# Patient Record
Sex: MALE | Race: WHITE | Employment: FULL TIME | URBAN - METROPOLITAN AREA
[De-identification: names, ages, dates, MRNs, and addresses within clinical notes are randomized per-mention and may not be internally consistent; named-entity substitution may affect disease eponyms.]

---

## 2017-09-27 ENCOUNTER — THERAPY VISIT (OUTPATIENT)
Dept: PHYSICAL THERAPY | Facility: CLINIC | Age: 52
End: 2017-09-27
Payer: OTHER MISCELLANEOUS

## 2017-09-27 DIAGNOSIS — Z98.890 H/O REPAIR OF LEFT ROTATOR CUFF: ICD-10-CM

## 2017-09-27 DIAGNOSIS — M25.512 ACUTE PAIN OF LEFT SHOULDER: Primary | ICD-10-CM

## 2017-09-27 PROCEDURE — 97161 PT EVAL LOW COMPLEX 20 MIN: CPT | Mod: GP | Performed by: PHYSICAL THERAPIST

## 2017-09-27 PROCEDURE — 97110 THERAPEUTIC EXERCISES: CPT | Mod: GP | Performed by: PHYSICAL THERAPIST

## 2017-09-27 NOTE — LETTER
[facility]  [address]  [place] [address]  [phone]    2017    Re: Brad Peter   :   1965  MRN:  [id number]   REFERRING PHYSICIAN:   [facility]    Date of Initial Evaluation:  17  Visits:  Rxs Used: 1  Reason for Referral:     Acute pain of left shoulder  H/O repair of left rotator cuff    [facility] Initial Evaluation    Subjective:  Patient is a 52 year old male presenting with rehab left shoulder hpi. The history is provided by the patient. No  was used.   Brad Peter is a 52 year old male with a left shoulder condition.  Condition occurred with:  Repetition/overuse.  Condition occurred: at work.  This is a chronic condition  DOS: 17, MD order for physical therapy to address s/p L RCR.    Patient reports pain:  Anterior and lateral.    Pain is described as aching  and reported as 3/10.  Associated symptoms:  Loss of motion/stiffness.   Symptoms are exacerbated by certain positions and relieved by rest and ice.          General health as reported by patient is good.  Pertinent medical history includes:  Sleep disorder/apnea, depression and overweight.      Current medications:  Hormone replacement therapy and anti-depressants.  Current occupation is [employer] .  Patient is currently not working due to present treatment problem.  Primary job tasks include:  Prolonged standing, lifting, repetitive tasks, operating a machine and other (pushing/pulling).  Barriers include:  None as reported by the patient.  Red flags:  None as reported by the patient.    Objective:    Shoulder Evaluation:  ROM:    PROM:    Flexion:  Left:  120-130        Internal Rotation:  Left:  20-40      External Rotation:  Left:  70        Assessment/Plan:    Patient is a 52 year old male with left side shoulder complaints.    Patient has the following significant findings with corresponding  treatment plan.                Diagnosis 1:  S/p L RCR    Pain -  hot/cold therapy, self management, education and home program  Decreased ROM/flexibility - manual therapy, therapeutic exercise and home program  Decreased strength - therapeutic exercise, therapeutic activities and home program  Decreased proprioception - neuro re-education, therapeutic activities and home program  Decreased function - therapeutic activities and home program    Therapy Evaluation Codes:   1) History comprised of:   Personal factors that impact the plan of care:      Anxiety.    Comorbidity factors that impact the plan of care are:      Overweight and Sleep disorder/apnea.     Medications impacting care: Anti-depressant.  2) Examination of Body Systems comprised of:   Body structures and functions that impact the plan of care:      Shoulder.   Activity limitations that impact the plan of care are:      Bathing, Dressing, Lifting, Throwing and Working.  3) Clinical presentation characteristics are:   Stable/Uncomplicated.  4) Decision-Making    Low complexity using standardized patient assessment instrument and/or measureable assessment of functional outcome.    Cumulative Therapy Evaluation is: Low complexity.  Previous and current functional limitations:  (See Goal Flow Sheet for this information)    Short term and Long term goals: (See Goal Flow Sheet for this information)   Communication ability:  Patient appears to be able to clearly communicate and understand verbal and written communication and follow directions correctly.  Treatment Explanation - The following has been discussed with the patient:   RX ordered/plan of care  Anticipated outcomes  Possible risks and side effects  This patient would benefit from PT intervention to resume normal activities.   Rehab potential is good.    Frequency:  1 X week, once daily  Duration:  for 10 weeks  Discharge Plan:  Achieve all LTG.  Independent in home treatment program.  Reach maximal  therapeutic benefit.    Thank you for your referral.    INQUIRIES  Therapist: [facility]  [address]  [place] [address]  Phone: [phone]  Fax: [phone]

## 2017-09-27 NOTE — PROGRESS NOTES
[facility] Initial Evaluation    Subjective:    Patient is a 52 year old male presenting with rehab left shoulder hpi. The history is provided by the patient. No  was used.   Brad Peter is a 52 year old male with a left shoulder condition.  Condition occurred with:  Repetition/overuse.  Condition occurred: at work.  This is a chronic condition  DOS: 9/6/17, MD order for physical therapy to address s/p L RCR.    Patient reports pain:  Anterior and lateral.    Pain is described as aching  and reported as 3/10.  Associated symptoms:  Loss of motion/stiffness.   Symptoms are exacerbated by certain positions and relieved by rest and ice.          General health as reported by patient is good.  Pertinent medical history includes:  Sleep disorder/apnea, depression and overweight.      Current medications:  Hormone replacement therapy and anti-depressants.  Current occupation is [employer] .  Patient is currently not working due to present treatment problem.  Primary job tasks include:  Prolonged standing, lifting, repetitive tasks, operating a machine and other (pushing/pulling).    Barriers include:  None as reported by the patient.    Red flags:  None as reported by the patient.                        Objective:    System                   Shoulder Evaluation:  ROM:    PROM:    Flexion:  Left:  120-130              Internal Rotation:  Left:  20-40      External Rotation:  Left:  70                                                                   General     ROS    Assessment/Plan:      Patient is a 52 year old male with left side shoulder complaints.    Patient has the following significant findings with corresponding treatment plan.                Diagnosis 1:  S/p L RCR    Pain -  hot/cold therapy, self management, education and home program  Decreased ROM/flexibility - manual therapy, therapeutic exercise and home program  Decreased strength - therapeutic exercise,  therapeutic activities and home program  Decreased proprioception - neuro re-education, therapeutic activities and home program  Decreased function - therapeutic activities and home program    Therapy Evaluation Codes:   1) History comprised of:   Personal factors that impact the plan of care:      Anxiety.    Comorbidity factors that impact the plan of care are:      Overweight and Sleep disorder/apnea.     Medications impacting care: Anti-depressant.  2) Examination of Body Systems comprised of:   Body structures and functions that impact the plan of care:      Shoulder.   Activity limitations that impact the plan of care are:      Bathing, Dressing, Lifting, Throwing and Working.  3) Clinical presentation characteristics are:   Stable/Uncomplicated.  4) Decision-Making    Low complexity using standardized patient assessment instrument and/or measureable assessment of functional outcome.  Cumulative Therapy Evaluation is: Low complexity.    Previous and current functional limitations:  (See Goal Flow Sheet for this information)    Short term and Long term goals: (See Goal Flow Sheet for this information)     Communication ability:  Patient appears to be able to clearly communicate and understand verbal and written communication and follow directions correctly.  Treatment Explanation - The following has been discussed with the patient:   RX ordered/plan of care  Anticipated outcomes  Possible risks and side effects  This patient would benefit from PT intervention to resume normal activities.   Rehab potential is good.    Frequency:  1 X week, once daily  Duration:  for 10 weeks  Discharge Plan:  Achieve all LTG.  Independent in home treatment program.  Reach maximal therapeutic benefit.    Please refer to the daily flowsheet for treatment today, total treatment time and time spent performing 1:1 timed codes.

## 2017-10-04 ENCOUNTER — THERAPY VISIT (OUTPATIENT)
Dept: PHYSICAL THERAPY | Facility: CLINIC | Age: 52
End: 2017-10-04
Payer: OTHER MISCELLANEOUS

## 2017-10-04 DIAGNOSIS — Z98.890 H/O REPAIR OF LEFT ROTATOR CUFF: ICD-10-CM

## 2017-10-04 DIAGNOSIS — M25.512 ACUTE PAIN OF LEFT SHOULDER: ICD-10-CM

## 2017-10-04 PROCEDURE — 97112 NEUROMUSCULAR REEDUCATION: CPT | Mod: GP | Performed by: PHYSICAL THERAPIST

## 2017-10-04 PROCEDURE — 97110 THERAPEUTIC EXERCISES: CPT | Mod: GP | Performed by: PHYSICAL THERAPIST

## 2017-10-11 ENCOUNTER — THERAPY VISIT (OUTPATIENT)
Dept: PHYSICAL THERAPY | Facility: CLINIC | Age: 52
End: 2017-10-11
Payer: OTHER MISCELLANEOUS

## 2017-10-11 DIAGNOSIS — M25.512 ACUTE PAIN OF LEFT SHOULDER: ICD-10-CM

## 2017-10-11 DIAGNOSIS — Z98.890 H/O REPAIR OF LEFT ROTATOR CUFF: ICD-10-CM

## 2017-10-11 PROCEDURE — 97110 THERAPEUTIC EXERCISES: CPT | Mod: GP | Performed by: PHYSICAL THERAPIST

## 2017-10-11 PROCEDURE — 97112 NEUROMUSCULAR REEDUCATION: CPT | Mod: GP | Performed by: PHYSICAL THERAPIST

## 2017-10-27 ENCOUNTER — THERAPY VISIT (OUTPATIENT)
Dept: PHYSICAL THERAPY | Facility: CLINIC | Age: 52
End: 2017-10-27
Payer: OTHER MISCELLANEOUS

## 2017-10-27 DIAGNOSIS — Z98.890 H/O REPAIR OF LEFT ROTATOR CUFF: ICD-10-CM

## 2017-10-27 DIAGNOSIS — M25.512 ACUTE PAIN OF LEFT SHOULDER: ICD-10-CM

## 2017-10-27 PROCEDURE — 97112 NEUROMUSCULAR REEDUCATION: CPT | Mod: GP | Performed by: PHYSICAL THERAPIST

## 2017-10-27 PROCEDURE — 97110 THERAPEUTIC EXERCISES: CPT | Mod: GP | Performed by: PHYSICAL THERAPIST

## 2017-10-27 NOTE — PROGRESS NOTES
Subjective:    HPI                    Objective:    System    Physical Exam    General     ROS    Assessment/Plan:      PROGRESS  REPORT        SUBJECTIVE  Subjective: 6 weeks post op. Intermittant soreness due to challenges at work .   Current Pain level: 1/10   Initial Pain level: 3/10   Changes in function: Yes, see goal flow sheet for change in function   Adverse reactions: None;   ,         OBJECTIVE  Objective: ER 50, IR 70, flexion 140, Abduction 90   Phase II HEP     ASSESSMENT/PLAN  Updated problem list and treatment plan: Diagnosis 1:  L Shoulder RCR     STG/LTGs have been met or progress has been made towards goals:  Yes (See Goal flow sheet completed today.)  Assessment of Progress: The patient's condition is improving.  Self Management Plans:  Patient has been instructed in a home treatment program.  I have re-evaluated this patient and find that the nature, scope, duration and intensity of the therapy is appropriate for the medical condition of the patient.        Recommendations:  This patient would benefit from further evaluation.    Please refer to the daily flowsheet for treatment today, total treatment time and time spent performing 1:1 timed codes.

## 2017-10-27 NOTE — LETTER
[facility]  [address]  [place] [id number] [phone]    2017    Re: Brad Peter   :   1965  MRN:  [id number]   REFERRING PHYSICIAN:   [facility]    Date of Initial Evaluation:  17  Visits:  Rxs Used: 4  Reason for Referral:     Acute pain of left shoulder  H/O repair of left rotator cuff    PROGRESS  REPORT    SUBJECTIVE  Subjective: 6 weeks post op. Intermittant soreness due to challenges at work .   Current Pain level: 1/10   Initial Pain level: 3/10   Changes in function: Yes, see goal flow sheet for change in function   Adverse reactions: None    OBJECTIVE  Objective: ER 50, IR 70, flexion 140, Abduction 90   Phase II HEP     ASSESSMENT/PLAN  Updated problem list and treatment plan: Diagnosis 1:  L Shoulder RCR     STG/LTGs have been met or progress has been made towards goals:  Yes (See Goal flow sheet completed today.)  Assessment of Progress: The patient's condition is improving.  Self Management Plans:  Patient has been instructed in a home treatment program.  I have re-evaluated this patient and find that the nature, scope, duration and intensity of the therapy is appropriate for the medical condition of the patient.    Recommendations:  This patient would benefit from further evaluation.  Thank you for your referral.    INQUIRIES  Therapist: [facility]  [address]  [place] [address]  Phone: [phone]  Fax: [phone]

## 2017-11-01 ENCOUNTER — THERAPY VISIT (OUTPATIENT)
Dept: PHYSICAL THERAPY | Facility: CLINIC | Age: 52
End: 2017-11-01
Payer: OTHER MISCELLANEOUS

## 2017-11-01 DIAGNOSIS — M25.512 ACUTE PAIN OF LEFT SHOULDER: ICD-10-CM

## 2017-11-01 DIAGNOSIS — Z98.890 H/O REPAIR OF LEFT ROTATOR CUFF: ICD-10-CM

## 2017-11-01 PROCEDURE — 97112 NEUROMUSCULAR REEDUCATION: CPT | Mod: GP | Performed by: PHYSICAL THERAPIST

## 2017-11-01 PROCEDURE — 97110 THERAPEUTIC EXERCISES: CPT | Mod: GP | Performed by: PHYSICAL THERAPIST

## 2017-11-20 ENCOUNTER — THERAPY VISIT (OUTPATIENT)
Dept: PHYSICAL THERAPY | Facility: CLINIC | Age: 52
End: 2017-11-20
Payer: OTHER MISCELLANEOUS

## 2017-11-20 DIAGNOSIS — M25.512 ACUTE PAIN OF LEFT SHOULDER: ICD-10-CM

## 2017-11-20 DIAGNOSIS — Z98.890 H/O REPAIR OF LEFT ROTATOR CUFF: ICD-10-CM

## 2017-11-20 PROCEDURE — 97110 THERAPEUTIC EXERCISES: CPT | Mod: GP | Performed by: PHYSICAL THERAPIST

## 2017-11-20 PROCEDURE — 97112 NEUROMUSCULAR REEDUCATION: CPT | Mod: GP | Performed by: PHYSICAL THERAPIST

## 2017-11-27 ENCOUNTER — THERAPY VISIT (OUTPATIENT)
Dept: PHYSICAL THERAPY | Facility: CLINIC | Age: 52
End: 2017-11-27
Payer: OTHER MISCELLANEOUS

## 2017-11-27 DIAGNOSIS — M25.512 ACUTE PAIN OF LEFT SHOULDER: ICD-10-CM

## 2017-11-27 DIAGNOSIS — Z98.890 H/O REPAIR OF LEFT ROTATOR CUFF: ICD-10-CM

## 2017-11-27 PROCEDURE — 97110 THERAPEUTIC EXERCISES: CPT | Mod: GP | Performed by: PHYSICAL THERAPIST

## 2017-11-27 PROCEDURE — 97112 NEUROMUSCULAR REEDUCATION: CPT | Mod: GP | Performed by: PHYSICAL THERAPIST

## 2017-12-06 ENCOUNTER — THERAPY VISIT (OUTPATIENT)
Dept: PHYSICAL THERAPY | Facility: CLINIC | Age: 52
End: 2017-12-06
Payer: OTHER MISCELLANEOUS

## 2017-12-06 DIAGNOSIS — Z98.890 H/O REPAIR OF LEFT ROTATOR CUFF: ICD-10-CM

## 2017-12-06 DIAGNOSIS — M25.512 ACUTE PAIN OF LEFT SHOULDER: ICD-10-CM

## 2017-12-06 PROCEDURE — 97112 NEUROMUSCULAR REEDUCATION: CPT | Mod: GP | Performed by: PHYSICAL THERAPIST

## 2017-12-06 PROCEDURE — 97110 THERAPEUTIC EXERCISES: CPT | Mod: GP | Performed by: PHYSICAL THERAPIST

## 2017-12-06 NOTE — PROGRESS NOTES
Subjective:    HPI                    Objective:    System    Physical Exam    General     ROS    Assessment/Plan:      PROGRESS  REPORT    Progress reporting period is from 10/27/17 to 12/6/17. 8 visits since initiating therapy .     SUBJECTIVE  Subjective: 12 weeks posyt op Subscap /bicep tenodesis arthroscopically..  Pain is low most of the time. Working with limitations still at his work place.     SPADI improved 23/100       Current Pain level: 0/10   Initial Pain level: 3/10   Changes in function: Yes, see goal flow sheet for change in function   Adverse reactions: None;   ,         OBJECTIVE  Objective: Flexion 160 vs 170 opposite side overhead reach. ER 70 vs 90  , IR 85 vs 90    posterior reach L5 L  and T12 R.     Able to advance to Phase III of post op protocol with emphais to ROM and scap strength.high reps, no weight.           ASSESSMENT/PLAN  Updated problem list and treatment plan: Diagnosis 1:  L post op L RCR     STG/LTGs have been met or progress has been made towards goals:  Yes (See Goal flow sheet completed today.)  Assessment of Progress: The patient's condition is improving.  The patient's condition has potential to improve.  Self Management Plans:  Patient has been instructed in a home treatment program.      Recommendations:  This patient would benefit from continued therapy.     Frequency:  1 X week, to every other week once daily  Duration:  for 12 weeks          Please refer to the daily flowsheet for treatment today, total treatment time and time spent performing 1:1 timed codes.

## 2017-12-06 NOTE — LETTER
[facility]   [address] [place] [id number] [phone]    2017    Re: Brad Peter   :   1965  MRN:  [id number]   REFERRING PHYSICIAN:   [facility]    Date of Initial Evaluation:  10/27/17  Visits:  Rxs Used: 8  Reason for Referral:     Acute pain of left shoulder  H/O repair of left rotator cuff    PROGRESS  REPORT  Progress reporting period is from 10/27/17 to 17. 8 visits since initiating therapy .     SUBJECTIVE  Subjective: 12 weeks posyt op Subscap /bicep tenodesis arthroscopically..  Pain is low most of the time. Working with limitations still at his work place.   SPADI improved 23/100   Current Pain level: 0/10   Initial Pain level: 3/10   Changes in function: Yes, see goal flow sheet for change in function   Adverse reactions: None       OBJECTIVE  Objective: Flexion 160 vs 170 opposite side overhead reach. ER 70 vs 90  , IR 85 vs 90    posterior reach L5 L  and T12 R.   Able to advance to Phase III of post op protocol with emphais to ROM and scap strength.high reps, no weight.       ASSESSMENT/PLAN  Updated problem list and treatment plan: Diagnosis 1:  L post op L RCR     STG/LTGs have been met or progress has been made towards goals:  Yes (See Goal flow sheet completed today.)  Assessment of Progress: The patient's condition is improving.  The patient's condition has potential to improve.  Self Management Plans:  Patient has been instructed in a home treatment program.    Recommendations:  This patient would benefit from continued therapy.     Frequency:  1 X week, to every other week once daily  Duration:  for 12 weeks    Thank you for your referral.    INQUIRIES  Therapist: [facility]   [address] [place] [address]  Phone: [phone]  Fax: [phone]

## 2017-12-11 ENCOUNTER — THERAPY VISIT (OUTPATIENT)
Dept: PHYSICAL THERAPY | Facility: CLINIC | Age: 52
End: 2017-12-11
Payer: OTHER MISCELLANEOUS

## 2017-12-11 DIAGNOSIS — M25.512 ACUTE PAIN OF LEFT SHOULDER: ICD-10-CM

## 2017-12-11 DIAGNOSIS — Z98.890 H/O REPAIR OF LEFT ROTATOR CUFF: ICD-10-CM

## 2017-12-11 PROCEDURE — 97530 THERAPEUTIC ACTIVITIES: CPT | Mod: GP | Performed by: PHYSICAL THERAPIST

## 2017-12-11 PROCEDURE — 97110 THERAPEUTIC EXERCISES: CPT | Mod: GP | Performed by: PHYSICAL THERAPIST

## 2017-12-11 PROCEDURE — 97112 NEUROMUSCULAR REEDUCATION: CPT | Mod: GP | Performed by: PHYSICAL THERAPIST

## 2017-12-27 ENCOUNTER — THERAPY VISIT (OUTPATIENT)
Dept: PHYSICAL THERAPY | Facility: CLINIC | Age: 52
End: 2017-12-27
Payer: OTHER MISCELLANEOUS

## 2017-12-27 DIAGNOSIS — Z98.890 H/O REPAIR OF LEFT ROTATOR CUFF: ICD-10-CM

## 2017-12-27 DIAGNOSIS — M25.512 ACUTE PAIN OF LEFT SHOULDER: ICD-10-CM

## 2017-12-27 PROCEDURE — 97110 THERAPEUTIC EXERCISES: CPT | Mod: GP | Performed by: PHYSICAL THERAPIST

## 2017-12-27 PROCEDURE — 97112 NEUROMUSCULAR REEDUCATION: CPT | Mod: GP | Performed by: PHYSICAL THERAPIST

## 2017-12-27 NOTE — LETTER
[facility]  [address]  [place] [address]  [phone]    2018    Re: Brad Peter   :   1965  MRN:  [id number]   REFERRING PHYSICIAN:   [facility]    Date of Initial Evaluation:  17  Visits:  Rxs Used: 10  Reason for Referral:     Acute pain of left shoulder  H/O repair of left rotator cuff    PROGRESS  REPORT    Progress reporting period is from 17 to 17.     SUBJECTIVE  Patient complains of being intermit sore, he feels due to stress and over use of  L arm at work at times.    He is approaching 4 months post op RCR (subscap), extensive debridement and bicep tenodesis.    On balance he is really responding favorably. Still restricted with activity and post op rehab based on MD referral and program post op.     Brad has done a nice job restoring ROM to roughly 90% of the opposite side. He is advised to 3x/week strengthening and gentle ROM work while he sustains work restrictions.     SPADI has improved from 45/100 to 20/100 at this point. Patient follows with his MD for post op consullt in early to mid 2018.     Patient has been seen for 10 visits post op in clinic since 2017.      Current Pain level: 0/10   Initial Pain level: 3/10   Changes in function: Yes, see goal flow sheet for change in function   Adverse reactions: None    OBJECTIVE  AROM flex 160, ER 85, IR 90. Weak and painful especially with IR resistance .     Modified HEP without Bear Hug exercise, which may be contributing to soreness.     Continue scap emphasis to strengthening as directed through [facility] post op RCR Program.     No weights at this point.    Continue to work with restrictions .     Functionally:  Floor to waist lift 1 RM 25, waist to shoulder 1 RM 25, outward reach 15# max. Limit lifting to 10-15 pounds under shoulder height, no overhead lift and limit outward lifting 5-8 #.     Follow up every 1-2  weeks in clinic  "to manage and update shoulder strength program.    Consider work conditioning at 4-5 months post op, unless directed by MD otherwise based on labor requirments of patients job demands as a \"Fluid \"       ASSESSMENT/PLAN  Updated problem list and treatment plan: Diagnosis 1:  Post op L shoulder Arthroscopic RCR , extensive debridement, open bicep tenodesis     STG/LTGs have been met or progress has been made towards goals:  Yes (See Goal flow sheet completed today.)  Assessment of Progress: The patient's condition is improving.  The patient's condition has potential to improve.  Self Management Plans:  Patient has been instructed in a home treatment program.    Recommendations:  This patient would benefit from continued therapy.     Frequency:  1 X week, to every other week once daily  Duration:  for 4 weeks    Follow up with MD for consult in Mid January 2018    Thank you for your referral.    INQUIRIES  Therapist: [facility]  [address] [place] [address]  Phone: [phone]  Fax: [phone]     "

## 2018-01-01 ENCOUNTER — TELEPHONE (OUTPATIENT)
Dept: PHYSICAL THERAPY | Facility: CLINIC | Age: 53
End: 2018-01-01

## 2018-01-02 NOTE — PROGRESS NOTES
"Subjective:  HPI                    Objective:  System    Physical Exam    General     ROS    Assessment/Plan:    PROGRESS  REPORT    Progress reporting period is from 12/6/17 to 12/27/17.     SUBJECTIVE  : Patient complains of being intermit sore, he feels due to stress and over use of  L arm at work at times.     He is approaching 4 months post op RCR (subscap), extensive debridement and bicep tenodesis.     On balance he is really responding favorably. Still restricted with activity and post op rehab based on MD referral and program post op.     Brad has done a nice job restoring ROM to roughly 90% of the opposite side. He is advised to 3x/week strengthening and gentle ROM work while he sustains work restrictions.     SPADI has improved from 45/100 to 20/100 at this point. Patient follows with his MD for post op consullt in early to mid January 2018.     Patient has been seen for 10 visits post op in clinic since September 2017.      Current Pain level: 0/10   Initial Pain level: 3/10   Changes in function: Yes, see goal flow sheet for change in function   Adverse reactions: None;   ,         OBJECTIVE  : AROM flex 160, ER 85, IR 90. Weak and painful especially with IR resistance .     Modified HEP without Bear Hug exercise, which may be contributing to soreness.     Continue scap emphasis to strengthening as directed through [facility] post op RCR Program.     No weights at this point.     Continue to work with restrictions .     Functionally:  Floor to waist lift 1 RM 25, waist to shoulder 1 RM 25, outward reach 15# max. Limit lifting to 10-15 pounds under shoulder height, no overhead lift and limit outward lifting 5-8 #.      Follow up every 1-2  weeks in clinic to manage and update shoulder strength program.     Consider work conditioning at 4-5 months post op, unless directed by MD otherwise based on labor requirments of patients job demands as a \"Fluid \"       ASSESSMENT/PLAN  Updated problem list " and treatment plan: Diagnosis 1:  Post op L shoulder Arthroscopic RCR , extensive debridement, open bicep tenodesis     STG/LTGs have been met or progress has been made towards goals:  Yes (See Goal flow sheet completed today.)  Assessment of Progress: The patient's condition is improving.  The patient's condition has potential to improve.  Self Management Plans:  Patient has been instructed in a home treatment program.        Recommendations:  This patient would benefit from continued therapy.     Frequency:  1 X week, to every other week once daily  Duration:  for 4 weeks    Follow up with MD for consult in Mid January 2018          Please refer to the daily flowsheet for treatment today, total treatment time and time spent performing 1:1 timed codes.

## 2018-01-02 NOTE — TELEPHONE ENCOUNTER
PT Auth Request 3 visits to [facility],  Edith Argueta    FAx sent with PT Progress Note.       3 future appointments have been authorized 1/3/18

## 2018-01-08 ENCOUNTER — THERAPY VISIT (OUTPATIENT)
Dept: PHYSICAL THERAPY | Facility: CLINIC | Age: 53
End: 2018-01-08
Payer: OTHER MISCELLANEOUS

## 2018-01-08 DIAGNOSIS — Z98.890 H/O REPAIR OF LEFT ROTATOR CUFF: ICD-10-CM

## 2018-01-08 DIAGNOSIS — M25.512 ACUTE PAIN OF LEFT SHOULDER: ICD-10-CM

## 2018-01-08 PROCEDURE — 97112 NEUROMUSCULAR REEDUCATION: CPT | Mod: GP | Performed by: PHYSICAL THERAPIST

## 2018-01-08 PROCEDURE — 97110 THERAPEUTIC EXERCISES: CPT | Mod: GP | Performed by: PHYSICAL THERAPIST

## 2018-01-08 NOTE — PROGRESS NOTES
Subjective:  HPI                    Objective:  System    Physical Exam    General     ROS    Assessment/Plan:    PROGRESS  REPORT    Progress reporting period is from 12/27/17 to 1/8/17.     SUBJECTIVE  Subjective: Primary concern of R shoulder pain, post surgical  1 year.  L shoulder 4 months post op. Feeling well on the L  as a revision surgry. Follow up with L shoulder consult and MD visit      SPADI 14/100.     Patient avoids overhead use and no lifting greater than 5-10 pounds below waste height.       Current Pain level: 0/10   Initial Pain level: 3/10   Changes in function: Yes, see goal flow sheet for change in function   Adverse reactions: None;   ,         OBJECTIVE   AROM 160-170 flexion, IR 80-90, ER 80. POst op program at 4 months, emphasis to gradual restoration of ROM and  scap strength as directed in post op programming.       ASSESSMENT/PLAN  Updated problem list and treatment plan: Diagnosis 1:  L shoulder post op RCR   Decreased strength - therapeutic exercise and therapeutic activities  STG/LTGs have been met or progress has been made towards goals:  Yes (See Goal flow sheet completed today.)  Assessment of Progress: The patient's condition is improving.  The patient's condition has potential to improve.  Self Management Plans:  Patient has been instructed in a home treatment program.    The patient is returning to your office for a recheck appointment.    Recommendations:  This patient would benefit from further evaluation.    Consider PT every other week for the next 8 weeks to complete RTC programming at 6 mo unless directed by MD otherwise.     Please refer to the daily flowsheet for treatment today, total treatment time and time spent performing 1:1 timed codes.

## 2018-01-22 ENCOUNTER — THERAPY VISIT (OUTPATIENT)
Dept: PHYSICAL THERAPY | Facility: CLINIC | Age: 53
End: 2018-01-22
Payer: OTHER MISCELLANEOUS

## 2018-01-22 DIAGNOSIS — M25.512 ACUTE PAIN OF LEFT SHOULDER: ICD-10-CM

## 2018-01-22 DIAGNOSIS — Z98.890 H/O REPAIR OF LEFT ROTATOR CUFF: ICD-10-CM

## 2018-01-22 PROCEDURE — 97110 THERAPEUTIC EXERCISES: CPT | Mod: GP | Performed by: PHYSICAL THERAPIST

## 2018-01-22 PROCEDURE — 97530 THERAPEUTIC ACTIVITIES: CPT | Mod: GP | Performed by: PHYSICAL THERAPIST

## 2018-01-22 PROCEDURE — 97112 NEUROMUSCULAR REEDUCATION: CPT | Mod: GP | Performed by: PHYSICAL THERAPIST

## 2018-01-22 NOTE — LETTER
[facility]   [address] [place] [id number] [phone]    2018    Re: Brad Peter   :   1965  MRN:  [id number]   REFERRING PHYSICIAN:   [facility]    Date of Initial Evaluation:  18  Visits:  Rxs Used: 12  Reason for Referral:     Acute pain of left shoulder  H/O repair of left rotator cuff    PROGRESS  REPORT  Progress reporting period is from 18 to 18. 4 1/2 -5 month post op. .     SUBJECTIVE  Subjective: 4 1/2- 5   months post op L RCR.  Minimal to no pain , transitioning to work conditioning   Current Pain level: 0/10   Initial Pain level: 3/10   Changes in function: Yes, see goal flow sheet for change in function   Adverse reactions: None     OBJECTIVE  Objective: IR 80, ER 80-. overhead reach 160-170. Weak without pain ER, abductuon and outward reach 4/5. ADvanced RTC strength to final stage of program over the next month and will be transitioning to work hardening program     ASSESSMENT/PLAN  Updated problem list and treatment plan: Diagnosis 1:  L RCR     STG/LTGs have been met or progress has been made towards goals:  Yes (See Goal flow sheet completed today.)  Assessment of Progress: The patient's condition is improving.  The patient's condition has potential to improve.  Self Management Plans:  Patient has been instructed in a home treatment program.    Recommendations:  This patient would benefit from continued therapy.     Frequency:  1-2 X week, once daily  Duration:  for 4 weeks    Thank you for your referral.    INQUIRIES  Therapist: [facility]  [address]  [place] [address]  Phone: [phone]  Fax: [phone]

## 2018-02-02 ENCOUNTER — THERAPY VISIT (OUTPATIENT)
Dept: PHYSICAL THERAPY | Facility: CLINIC | Age: 53
End: 2018-02-02
Payer: OTHER MISCELLANEOUS

## 2018-02-02 DIAGNOSIS — M25.512 ACUTE PAIN OF LEFT SHOULDER: ICD-10-CM

## 2018-02-02 DIAGNOSIS — Z98.890 H/O REPAIR OF LEFT ROTATOR CUFF: ICD-10-CM

## 2018-02-02 PROCEDURE — 97112 NEUROMUSCULAR REEDUCATION: CPT | Mod: GP | Performed by: PHYSICAL THERAPIST

## 2018-02-02 PROCEDURE — 97110 THERAPEUTIC EXERCISES: CPT | Mod: GP | Performed by: PHYSICAL THERAPIST

## 2018-02-02 NOTE — PROGRESS NOTES
Subjective:  HPI                    Objective:  System    Physical Exam    General     ROS    Assessment/Plan:    PROGRESS  REPORT    Progress reporting period is from 1/8/18 to 2/2/18. 4 1/2 -5 month post op. .     SUBJECTIVE  Subjective: 4 1/2- 5   months post op L RCR.  Minimal to no pain , transitioning to work conditioning     Current Pain level: 0/10   Initial Pain level: 3/10   Changes in function: Yes, see goal flow sheet for change in function   Adverse reactions: None;   ,         OBJECTIVE  Objective: IR 80, ER 80-. overhead reach 160-170. Weak without pain ER, abductuon and outward reach 4/5. ADvanced RTC strength to final stage of program over the next month and will be transitioning to work hardening program       ASSESSMENT/PLAN  Updated problem list and treatment plan: Diagnosis 1:  L RCR     STG/LTGs have been met or progress has been made towards goals:  Yes (See Goal flow sheet completed today.)  Assessment of Progress: The patient's condition is improving.  The patient's condition has potential to improve.  Self Management Plans:  Patient has been instructed in a home treatment program.      Recommendations:  This patient would benefit from continued therapy.     Frequency:  1-2 X week, once daily  Duration:  for 4 weeks        Please refer to the daily flowsheet for treatment today, total treatment time and time spent performing 1:1 timed codes.

## 2018-02-04 ENCOUNTER — TELEPHONE (OUTPATIENT)
Dept: PHYSICAL THERAPY | Facility: CLINIC | Age: 53
End: 2018-02-04

## 2018-02-05 NOTE — TELEPHONE ENCOUNTER
Edith Argueta at [facility] has been contacted via fax for  PT Authorization up to 8 visits as Brad transitions from formal PT to Work Conditioning program directed by his MD

## 2018-02-09 ENCOUNTER — THERAPY VISIT (OUTPATIENT)
Dept: PHYSICAL THERAPY | Facility: CLINIC | Age: 53
End: 2018-02-09
Payer: OTHER MISCELLANEOUS

## 2018-02-09 DIAGNOSIS — M25.512 ACUTE PAIN OF LEFT SHOULDER: ICD-10-CM

## 2018-02-09 DIAGNOSIS — Z98.890 H/O REPAIR OF LEFT ROTATOR CUFF: ICD-10-CM

## 2018-02-09 PROCEDURE — 97112 NEUROMUSCULAR REEDUCATION: CPT | Mod: GP | Performed by: PHYSICAL THERAPIST

## 2018-02-09 PROCEDURE — 97110 THERAPEUTIC EXERCISES: CPT | Mod: GP | Performed by: PHYSICAL THERAPIST

## 2018-10-17 PROBLEM — M25.512 ACUTE PAIN OF LEFT SHOULDER: Status: RESOLVED | Noted: 2017-09-27 | Resolved: 2018-10-17

## 2018-10-17 PROBLEM — Z98.890 H/O REPAIR OF LEFT ROTATOR CUFF: Status: RESOLVED | Noted: 2017-09-27 | Resolved: 2018-10-17

## 2018-10-17 NOTE — PROGRESS NOTES
Subjective:  HPI                    Objective:  System    Physical Exam    General     ROS    Assessment/Plan:    DISCHARGE REPORT        SUBJECTIVE  Subjective: 5 1/2 months post op. Patient is improving ROM, strength and function. Tender over lateral deltoid and anterior shoulder secondary to over use of the arm with day to day activity    Current Pain level: 0/10   Initial Pain level: 3/10   Changes in function: Yes, see goal flow sheet for change in function   Adverse reactions: None;   ,         OBJECTIVE  Objective: Patioent feels that he is ready to transition to next step of program and DC care here at this point. Follow up with MD for consult and possible work hardening  for prep back to fuill unrestricted function.       ASSESSMENT/PLAN  Updated problem list and treatment plan: Diagnosis 1:  Post op L RCR       The patient is returning to your office for a recheck appointment.    Recommendations:  This patient is ready to be discharged from therapy and continue their home treatment program.    Please refer to the daily flowsheet for treatment today, total treatment time and time spent performing 1:1 timed codes.

## 2019-11-08 ENCOUNTER — HEALTH MAINTENANCE LETTER (OUTPATIENT)
Age: 54
End: 2019-11-08

## 2020-12-06 ENCOUNTER — HEALTH MAINTENANCE LETTER (OUTPATIENT)
Age: 55
End: 2020-12-06

## 2021-05-29 ENCOUNTER — RECORDS - HEALTHEAST (OUTPATIENT)
Dept: ADMINISTRATIVE | Facility: CLINIC | Age: 56
End: 2021-05-29

## 2021-05-31 ENCOUNTER — RECORDS - HEALTHEAST (OUTPATIENT)
Dept: ADMINISTRATIVE | Facility: CLINIC | Age: 56
End: 2021-05-31

## 2021-09-25 ENCOUNTER — HEALTH MAINTENANCE LETTER (OUTPATIENT)
Age: 56
End: 2021-09-25

## 2022-01-15 ENCOUNTER — HEALTH MAINTENANCE LETTER (OUTPATIENT)
Age: 57
End: 2022-01-15

## 2023-04-22 ENCOUNTER — HEALTH MAINTENANCE LETTER (OUTPATIENT)
Age: 58
End: 2023-04-22